# Patient Record
(demographics unavailable — no encounter records)

---

## 2024-10-02 NOTE — ASSESSMENT
[FreeTextEntry1] : Ms. 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 She is doing well since the treatment.  Her MRI brain +/- on 9/27/24 demonstrates no significant change in size of lesion, with central necrosis indicative of treatment response.  She should follow up with her Neurooncologist Dr. Ajay Dawkins and Dr. Ayon as scheduled. She should return to the office in 3 months times with follow up MRI prior to visit. The patient understands the plan of care and is in agreement.  All questions answered to patient satisfaction.

## 2024-10-02 NOTE — DATA REVIEWED
[de-identified] : 	 [address] [place], [place]  [address]                                        Department of Radiology                                             [phone]   Patient Name:      LUIS FERNANDO GARCIA                  Location:       [facility] Rec #:        [id number]                    Account #:      [id number] YOB: 1960                    Ordering:       Veronica Ayon MD Age: 64               Sex:    F                 Attending:      Veronica Ayon MD PCP:        Jennifer Donahue MD ______________________________________________________________________________________  Exam Date:      09/27/24 Exam:         MRI BRAIN WAW  Order#:       MRI [id number]       MR brain with and without gadolinium   CLINICAL INFORMATION:   Gliosis sarcoma, radiation response   TECHNIQUE:   Sagittal and axial T1-weighted images, axial FLAIR images, axial T2-weighted images, axial gradient echo images and axial diffusion weighted images of the brain were obtained. Following 7.5 cc of Gadavist administration, 0 cc discarded, isotropic volumetric and fast spin echo T1- weighted images were obtained; this data was reformatted using image post processing software in multiple imaging planes.   Perfusion weighted imaging was also performed.   FINDINGS:   MRI dated 08/27/2024 is available for review.   The brain demonstrates no significant interval change in the size of the enhancing neoplasm in the RIGHT parieto-occipital lobe and RIGHT splenium of corpus callosum, measuring 8.4 cm AP by 4.6 cm TRV by 7.3 cm CC, however there appears to be more internal necrosis of the lesion indicating treatment response. No areas of increased perfusion are noted within the neoplasm. Small surgical cavity is seen at the lateral aspect of the neoplasm containing hemorrhagic blood products and overlying craniotomy. There is compression of the posterior horn of the RIGHT lateral ventricle. No acute cerebral cortical infarct is found. There is no midline shift. The vertebral and internal carotid arteries demonstrate expected flow voids indicating their patency.   The orbits are unremarkable.  The paranasal sinuses are significant for small mucocele in the RIGHT maxillary sinus.  The nasal cavity appears intact.  The nasopharynx is symmetric.  The central skull base and petrous temporal bones are intact.  The calvarium appears unremarkable.    IMPRESSION: Normal significant interval change in the size of the enhancing neoplasm in the RIGHT parieto-occipital lobe and RIGHT splenium of corpus callosum, measuring 8.4 cm AP by 4.6 cm TRV by 7.3 cm CC, however there appears to be more internal necrosis of the lesion indicating treatment response. . No areas of increased perfusion are noted within the neoplasm. Small surgical cavity is seen at the lateral aspect of the neoplasm containing hemorrhagic blood products and overlying craniotomy. There is compression of the posterior horn of the RIGHT lateral ventricle.   --- End of Report ---  ***Electronically Signed *** ----------------------------------------------- Chaparrita Jones MD              10/02/24 0812  Dictated on 10/02/24

## 2024-10-02 NOTE — HISTORY OF PRESENT ILLNESS
[FreeTextEntry1] : The patient has given verbal consent for this telehealth visit using two-way audio and video technology.  The patient is currently located at home [address] [place] [address], and I am located at my office at [facility].  Ms. Garcia returns to the office today for interval follow up s/p GKRS to right callosal recurrent Gliosarcoma IDH Wildtype WHO Grade 4 (15Gy) on 8/8/24.  Since the procedure, the patient has been feeling ***.  She has undergone a follow up MRI brain +/- on 9/27/24 at [facility] which I have independently reviewed today and which demonstrates no significant interval change in the size of the lesion, however increase in central necrosis indicative of treatment response.    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 Kirk on 7/13/2023. who presents to the office today for neurosurgical consultation for Gamma Knife Radiosurgery for treatment of her recurrent GBM. The patient was originally admitted to [facility] on 7/8/23 with complaints of headache/mental status change. On admission, head CT showed a large area of vasogenic edema centered in the right parietal lobe with associated mass effect and mild leftward midline shift.  She underwent a Right craniotomy for resection of tumor 7/13/23 with Dr. Kirk. Surgery and postop course uneventful. he underwent 30 fractions of RT to the right brain completed on 10/3/23 and chemotherapy and was enrolled in IA Avastin trial at [facility].  Her KPS is 90.  Most recent MRI 7/22/24 at [facility] shows evidence of progression.   Surg Hx: R craniotomy for resection of tumor 7/13/23, JARET/BSO Meds: Celexa, Estradiol Allergies: NKDA Soc Hx: nonsmoker, no EtOH, lives alone

## 2024-10-02 NOTE — PHYSICAL EXAM
[General Appearance - Alert] : alert [General Appearance - In No Acute Distress] : in no acute distress [FreeTextEntry5] : left field cut [FreeTextEntry6] : antigravity x 4 extremities

## 2024-10-11 NOTE — HISTORY OF PRESENT ILLNESS
[FreeTextEntry1] : The patient has given verbal consent for this telehealth visit using two-way audio and video technology.  The patient is currently located at home [address] [place] [address], and I am located at my office at [place].  Ms. Garcia returns to the office today for interval follow up s/p GKRS to right callosal recurrent Gliosarcoma IDH Wildtype WHO Grade 4 (15Gy) on 8/8/24.  Since the procedure, the patient has been feeling well, going to the gym 4 days a week.  She has undergone a follow up MRI brain +/- on 9/27/24 at [place] which I have independently reviewed today and which demonstrates no significant interval change in the size of the lesion, however increase in central necrosis indicative of treatment response.    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 Kirk on 7/13/2023. who presents to the office today for neurosurgical consultation for Gamma Knife Radiosurgery for treatment of her recurrent GBM. The patient was originally admitted to [place] on 7/8/23 with complaints of headache/mental status change. On admission, head CT showed a large area of vasogenic edema centered in the right parietal lobe with associated mass effect and mild leftward midline shift.  She underwent a Right craniotomy for resection of tumor 7/13/23 with Dr. Kirk. Surgery and postop course uneventful. he underwent 30 fractions of RT to the right brain completed on 10/3/23 and chemotherapy and was enrolled in IA Avastin trial at [facility].  Her KPS is 90.  Most recent MRI 7/22/24 at [place] shows evidence of progression.   Surg Hx: R craniotomy for resection of tumor 7/13/23, JARET/BSO Meds: Celexa, Estradiol Allergies: NKDA Soc Hx: nonsmoker, no EtOH, lives alone

## 2024-10-11 NOTE — HISTORY OF PRESENT ILLNESS
[FreeTextEntry1] : The patient has given verbal consent for this telehealth visit using two-way audio and video technology.  The patient is currently located at home [address] [place] [address], and I am located at my office at [facility].  Ms. Garcia returns to the office today for interval follow up s/p GKRS to right callosal recurrent Gliosarcoma IDH Wildtype WHO Grade 4 (15Gy) on 8/8/24.  Since the procedure, the patient has been feeling well, going to the gym 4 days a week.  She has undergone a follow up MRI brain +/- on 9/27/24 at [facility] which I have independently reviewed today and which demonstrates no significant interval change in the size of the lesion, however increase in central necrosis indicative of treatment response.    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 Kirk on 7/13/2023. who presents to the office today for neurosurgical consultation for Gamma Knife Radiosurgery for treatment of her recurrent GBM. The patient was originally admitted to [facility] on 7/8/23 with complaints of headache/mental status change. On admission, head CT showed a large area of vasogenic edema centered in the right parietal lobe with associated mass effect and mild leftward midline shift.  She underwent a Right craniotomy for resection of tumor 7/13/23 with Dr. Kirk. Surgery and postop course uneventful. he underwent 30 fractions of RT to the right brain completed on 10/3/23 and chemotherapy and was enrolled in IA Avastin trial at [facility].  Her KPS is 90.  Most recent MRI 7/22/24 at [facility] shows evidence of progression.   Surg Hx: R craniotomy for resection of tumor 7/13/23, JARET/BSO Meds: Celexa, Estradiol Allergies: NKDA Soc Hx: nonsmoker, no EtOH, lives alone

## 2024-10-11 NOTE — END OF VISIT
[FreeTextEntry3] : I have seen the patient and reviewed the case together with PA and I agree with the final recommendations and plan of care.  Frankie Juarez MD Neurosurgery  [Time Spent: ___ minutes] : I have spent [unfilled] minutes of time on the encounter which excludes teaching and separately reported services.

## 2024-10-11 NOTE — DATA REVIEWED
[de-identified] : 	 [address] [place], [place]  [address]                                        Department of Radiology                                             [phone]   Patient Name:      LUIS FERNANDO GARCIA                  Location:       [facility] Rec #:        [id number]                    Account #:      [id number] YOB: 1960                    Ordering:       Veronica Ayon MD Age: 64               Sex:    F                 Attending:      Veronica Ayon MD PCP:        Jennifer Donahue MD ______________________________________________________________________________________  Exam Date:      09/27/24 Exam:         MRI BRAIN WAW  Order#:       MRI [id number]       MR brain with and without gadolinium   CLINICAL INFORMATION:   Gliosis sarcoma, radiation response   TECHNIQUE:   Sagittal and axial T1-weighted images, axial FLAIR images, axial T2-weighted images, axial gradient echo images and axial diffusion weighted images of the brain were obtained. Following 7.5 cc of Gadavist administration, 0 cc discarded, isotropic volumetric and fast spin echo T1- weighted images were obtained; this data was reformatted using image post processing software in multiple imaging planes.   Perfusion weighted imaging was also performed.   FINDINGS:   MRI dated 08/27/2024 is available for review.   The brain demonstrates no significant interval change in the size of the enhancing neoplasm in the RIGHT parieto-occipital lobe and RIGHT splenium of corpus callosum, measuring 8.4 cm AP by 4.6 cm TRV by 7.3 cm CC, however there appears to be more internal necrosis of the lesion indicating treatment response. No areas of increased perfusion are noted within the neoplasm. Small surgical cavity is seen at the lateral aspect of the neoplasm containing hemorrhagic blood products and overlying craniotomy. There is compression of the posterior horn of the RIGHT lateral ventricle. No acute cerebral cortical infarct is found. There is no midline shift. The vertebral and internal carotid arteries demonstrate expected flow voids indicating their patency.   The orbits are unremarkable.  The paranasal sinuses are significant for small mucocele in the RIGHT maxillary sinus.  The nasal cavity appears intact.  The nasopharynx is symmetric.  The central skull base and petrous temporal bones are intact.  The calvarium appears unremarkable.    IMPRESSION: Normal significant interval change in the size of the enhancing neoplasm in the RIGHT parieto-occipital lobe and RIGHT splenium of corpus callosum, measuring 8.4 cm AP by 4.6 cm TRV by 7.3 cm CC, however there appears to be more internal necrosis of the lesion indicating treatment response. . No areas of increased perfusion are noted within the neoplasm. Small surgical cavity is seen at the lateral aspect of the neoplasm containing hemorrhagic blood products and overlying craniotomy. There is compression of the posterior horn of the RIGHT lateral ventricle.   --- End of Report ---  ***Electronically Signed *** ----------------------------------------------- Chaparrita Jones MD              10/02/24 0812  Dictated on 10/02/24

## 2024-10-11 NOTE — DATA REVIEWED
[de-identified] : 	 [address] [place], [place]  [address]                                        Department of Radiology                                             [phone]   Patient Name:      LUIS FERNANDO GARCIA                  Location:       [facility] Rec #:        [id number]                    Account #:      [id number] YOB: 1960                    Ordering:       Veronica Ayon MD Age: 64               Sex:    F                 Attending:      Veronica Ayon MD PCP:        Jennifer Donahue MD ______________________________________________________________________________________  Exam Date:      09/27/24 Exam:         MRI BRAIN WAW  Order#:       MRI [id number]       MR brain with and without gadolinium   CLINICAL INFORMATION:   Gliosis sarcoma, radiation response   TECHNIQUE:   Sagittal and axial T1-weighted images, axial FLAIR images, axial T2-weighted images, axial gradient echo images and axial diffusion weighted images of the brain were obtained. Following 7.5 cc of Gadavist administration, 0 cc discarded, isotropic volumetric and fast spin echo T1- weighted images were obtained; this data was reformatted using image post processing software in multiple imaging planes.   Perfusion weighted imaging was also performed.   FINDINGS:   MRI dated 08/27/2024 is available for review.   The brain demonstrates no significant interval change in the size of the enhancing neoplasm in the RIGHT parieto-occipital lobe and RIGHT splenium of corpus callosum, measuring 8.4 cm AP by 4.6 cm TRV by 7.3 cm CC, however there appears to be more internal necrosis of the lesion indicating treatment response. No areas of increased perfusion are noted within the neoplasm. Small surgical cavity is seen at the lateral aspect of the neoplasm containing hemorrhagic blood products and overlying craniotomy. There is compression of the posterior horn of the RIGHT lateral ventricle. No acute cerebral cortical infarct is found. There is no midline shift. The vertebral and internal carotid arteries demonstrate expected flow voids indicating their patency.   The orbits are unremarkable.  The paranasal sinuses are significant for small mucocele in the RIGHT maxillary sinus.  The nasal cavity appears intact.  The nasopharynx is symmetric.  The central skull base and petrous temporal bones are intact.  The calvarium appears unremarkable.    IMPRESSION: Normal significant interval change in the size of the enhancing neoplasm in the RIGHT parieto-occipital lobe and RIGHT splenium of corpus callosum, measuring 8.4 cm AP by 4.6 cm TRV by 7.3 cm CC, however there appears to be more internal necrosis of the lesion indicating treatment response. . No areas of increased perfusion are noted within the neoplasm. Small surgical cavity is seen at the lateral aspect of the neoplasm containing hemorrhagic blood products and overlying craniotomy. There is compression of the posterior horn of the RIGHT lateral ventricle.   --- End of Report ---  ***Electronically Signed *** ----------------------------------------------- Chaparrita Jones MD              10/02/24 0812  Dictated on 10/02/24

## 2024-11-07 NOTE — ASSESSMENT
[FreeTextEntry1] : 64-year-old female with right parietal lobe gliosarcoma, WHO grade IV, IDH wild-type, MGMT pending, non-EGFR s/p STR by  on 7/13 who presents for follow up after suffering recurrence and GTR by  on 12/14/23 with flap trial enrollment. 2nd recurrence in Feb 2024 with IA Avastin trial enrollment.  Gliosarcoma - MGMT unmethylated, TERT promoter mutant, PIK3CA mutant, MET amplified (at diagnosis - lost at recurrence)  Plan: --Taper dexamethasone to off, stop tomorrow --Continue with pembrolizumab and Avastin, will attempt to taper Avastin --ordered CBC, CMP, TSH today --patient is on chemotherapy with pembrolizumab/Avastin requiring intensive monitoring for toxicity with CBC, CMP, UA Q 3 wks --labs next visit: CBC, CMP, TSH  RTC at [facility] on 12/5  Vision loss - worsening likely 2/2 disease progression.

## 2024-11-07 NOTE — ASSESSMENT
[FreeTextEntry1] : 64-year-old female with right parietal lobe gliosarcoma, WHO grade IV, IDH wild-type, MGMT pending, non-EGFR s/p STR by  on 7/13 who presents for follow up after suffering recurrence and GTR by  on 12/14/23 with flap trial enrollment. 2nd recurrence in Feb 2024 with IA Avastin trial enrollment.  Gliosarcoma - MGMT unmethylated, TERT promoter mutant, PIK3CA mutant, MET amplified (at diagnosis - lost at recurrence)  Plan: --Taper dexamethasone to off, stop tomorrow --Continue with pembrolizumab and Avastin, will attempt to taper Avastin --ordered CBC, CMP, TSH today --patient is on chemotherapy with pembrolizumab/Avastin requiring intensive monitoring for toxicity with CBC, CMP, UA Q 3 wks --labs next visit: CBC, CMP, TSH  RTC at [facility] on 12/5  Vision loss - worsening likely 2/2 disease progression.

## 2024-11-07 NOTE — ASSESSMENT
[FreeTextEntry1] : 64-year-old female with right parietal lobe gliosarcoma, WHO grade IV, IDH wild-type, MGMT pending, non-EGFR s/p STR by  on 7/13 who presents for follow up after suffering recurrence and GTR by  on 12/14/23 with flap trial enrollment. 2nd recurrence in Feb 2024 with IA Avastin trial enrollment.  Gliosarcoma - MGMT unmethylated, TERT promoter mutant, PIK3CA mutant, MET amplified (at diagnosis - lost at recurrence)  Plan: --Taper dexamethasone to off, stop tomorrow --Continue with pembrolizumab and Avastin, will attempt to taper Avastin --ordered CBC, CMP, TSH today --patient is on chemotherapy with pembrolizumab/Avastin requiring intensive monitoring for toxicity with CBC, CMP, UA Q 3 wks --labs next visit: CBC, CMP, TSH  RTC at [facility] on 12/5  Vision loss - worsening likely 2/2 disease progression.
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
## 2024-11-18 NOTE — ASSESSMENT
[FreeTextEntry1] : My impression is that the patient suffers from Gliosarcoma.  Her MRIB from today shows evidence of progression. Her KPS is 80. I had a long discussion with the patient regarding the role of continuing  IV Avastin.  The patient was extensively educated about the nature of her disease process. Therapeutic and diagnostic tests include MRI brain w/wo in 1 month (December 2024). The patient should continue to see Dr. Dawkins. I will see the patient back in December to review and check progress.
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
## 2024-11-20 NOTE — REASON FOR VISIT
[Consideration of Curative Therapy] : consideration of curative therapy for [Brain Tumor] : brain tumor [Friend] : friend
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
## 2024-12-03 NOTE — PHYSICAL EXAM
Problem: Adult Inpatient Plan of Care  Goal: Absence of Hospital-Acquired Illness or Injury  Outcome: Ongoing, Progressing  Goal: Optimal Comfort and Wellbeing  Outcome: Ongoing, Progressing  Goal: Readiness for Transition of Care  Outcome: Ongoing, Progressing     Problem: Fall Injury Risk  Goal: Absence of Fall and Fall-Related Injury  Outcome: Ongoing, Progressing     Problem: Skin Injury Risk Increased  Goal: Skin Health and Integrity  Outcome: Ongoing, Progressing     Problem: Diabetes Comorbidity  Goal: Blood Glucose Level Within Targeted Range  Outcome: Ongoing, Progressing      [Normal] : affect appropriate
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
## 2024-12-05 NOTE — PHYSICAL EXAM
[Sensation] : the sensory exam was normal to light touch and pinprick [Motor Exam] : the motor exam was normal [Normal] : oriented to person, place and time, the affect was normal, the mood was normal and not anxious [de-identified] : Visual field cut and left side.  No cerebellar signs
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
## 2024-12-05 NOTE — PHYSICAL EXAM
[Sensation] : the sensory exam was normal to light touch and pinprick [Motor Exam] : the motor exam was normal [Normal] : oriented to person, place and time, the affect was normal, the mood was normal and not anxious [de-identified] : Visual field cut and left side.  No cerebellar signs

## 2024-12-05 NOTE — REVIEW OF SYSTEMS
[Fatigue] : fatigue [Difficulty Walking] : difficulty walking [Negative] : Allergic/Immunologic [Confused] : no confusion [Dizziness] : no dizziness [Fainting] : no fainting

## 2024-12-06 NOTE — ASSESSMENT
[FreeTextEntry1] : 64-year-old female with right parietal lobe gliosarcoma, WHO grade IV, IDH wild-type, MGMT pending, non-EGFR s/p STR by  on 7/13 who presents for follow up after suffering recurrence and GTR by  on 12/14/23 with flap trial enrollment. 2nd recurrence in Feb 2024 with IA Avastin trial enrollment.  Gliosarcoma - MGMT unmethylated, TERT promoter mutant, PIK3CA mutant, MET amplified (at diagnosis - lost at recurrence)  Plan: --start tapering dexamethasone again - 4 mg BID x 5 days, then 4/2 x 5 days, then 2/2 x 5 days --> c/w this taper if no HAs --C/w carboplatin/Avastin --ordered CBC, CMP, TSH today --patient is on chemotherapy with carboplatin/Avastin requiring intensive monitoring for toxicity with CBC, CMP, UA Q 3 wks --labs next visit: CBC, CMP, TSH --refer to  for supportive oncology --MRIB ordered, to be done before enxt visit  RTC at [facility] on 1/2/2025  Vision loss - worsening likely 2/2 disease progression.
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
## 2024-12-06 NOTE — ASSESSMENT
[FreeTextEntry1] : 64-year-old female with right parietal lobe gliosarcoma, WHO grade IV, IDH wild-type, MGMT pending, non-EGFR s/p STR by  on 7/13 who presents for follow up after suffering recurrence and GTR by  on 12/14/23 with flap trial enrollment. 2nd recurrence in Feb 2024 with IA Avastin trial enrollment.  Gliosarcoma - MGMT unmethylated, TERT promoter mutant, PIK3CA mutant, MET amplified (at diagnosis - lost at recurrence)  Plan: --start tapering dexamethasone again - 4 mg BID x 5 days, then 4/2 x 5 days, then 2/2 x 5 days --> c/w this taper if no HAs --C/w carboplatin/Avastin --ordered CBC, CMP, TSH today --patient is on chemotherapy with carboplatin/Avastin requiring intensive monitoring for toxicity with CBC, CMP, UA Q 3 wks --labs next visit: CBC, CMP, TSH --refer to  for supportive oncology --MRIB ordered, to be done before enxt visit  RTC at [facility] on 1/2/2025  Vision loss - worsening likely 2/2 disease progression.

## 2024-12-06 NOTE — ASSESSMENT
[FreeTextEntry1] : 64-year-old female with right parietal lobe gliosarcoma, WHO grade IV, IDH wild-type, MGMT pending, non-EGFR s/p STR by  on 7/13 who presents for follow up after suffering recurrence and GTR by  on 12/14/23 with flap trial enrollment. 2nd recurrence in Feb 2024 with IA Avastin trial enrollment.  Gliosarcoma - MGMT unmethylated, TERT promoter mutant, PIK3CA mutant, MET amplified (at diagnosis - lost at recurrence)  Plan: --start tapering dexamethasone again - 4 mg BID x 5 days, then 4/2 x 5 days, then 2/2 x 5 days --> c/w this taper if no HAs --C/w carboplatin/Avastin --ordered CBC, CMP, TSH today --patient is on chemotherapy with carboplatin/Avastin requiring intensive monitoring for toxicity with CBC, CMP, UA Q 3 wks --labs next visit: CBC, CMP, TSH --refer to  for supportive oncology --MRIB ordered, to be done before enxt visit  RTC at [facility] on 1/2/2025  Vision loss - worsening likely 2/2 disease progression.
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
## 2024-12-13 NOTE — GOALS
[Patient] : patient [DNR/DNI] : DNR/DNI [Hospice discussed] : Hospice discussed [MOLST Completed] : MOLST Completed [Time Spent: ___ minutes] : I, personally, spent [unfilled] minutes on advance care planning services with the patient.  This time is separate and distinct from any other care management services provided on this date [FreeTextEntry6] : Yusra Doe [FreeTextEntry5] : friend [FreeTextEntry7] : Discussed terminal nature of patient's cancer. She is fully aware that her cancer will continue to progress and that there is no cure. Recently, she started to make preparations for when she dies, including making a will, making  arrangements and making arrangements to donate her brain to research. She has also assigned her brother Franco Kumar as her proxy. We discussed what quality of life would be important to her and she explained that she has always been a very active person, going to the gym regularly, going out to dinner, seeing friends. But for her, the most important aspect of her life is being able to engage with friends and family in a meaningful way. We spoke about code status and she was clear that she does not want to undergo CPR or be intubated under any circumstances. We also discussed hospice and she stated that "when the time comes, I want to be on hospice at home." We spoke about how she would need to stop treatment to be eligible for hospice, which she understood, and she even explained that though she lives alone, her brother has already agreed to come from [place] and stay with her when the time comes for her to be on hospice. [LastACPVerDate] : 12/13/2024

## 2024-12-13 NOTE — HISTORY OF PRESENT ILLNESS
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 Accompanied by friend Yusra.   Onc Hx: Presented 7/2023 to [facility] with HA, MRI done which showed mass of R parietal lobe (4.9x4.3x4.9cm). Underwent R temporoparietal craniotomy and resection of tumor, biopsy positive for high grade glioma. Treated post-resection with TMZ and RT, completed 10/2023. Treatment: TMZ/RT completed 10/2023. Followed by IA Avastin 2/2024-6/2024. Then Carboplatin + Avastin 9/2024, ongoing.  Providers: Oncologist - Ajay Dawkins   SYMPTOMS: Most debilitating symptom is HA and L sided vision loss. Headaches have improved over past months since dexamethasone increased. On a dex taper currently. Now getting occasional mild headaches which are well managed with tylenol. Recently has noticed that she goes out less, particularly over the past month, due to feeling slightly disoriented at times and due to vision loss. Friend has noticed that this occurs particularly in new places.  ISTOP Ref #: [id number]   Advanced Directives: HCP - Franco Kumar (brother) MOLST - None  SH: Was a  for [employer] prior to diagnosis. Has a daughter and grandson who live in [place]. Has a good social [place] locally who are very supportive. Has a brother (in [place], her HCP) and sister.  ROS: If [ ] blank, symptom not present Fatigue [ ] Nausea [ ] Loss of appetite [ ] Unintentional weight loss [ ] Constipation [ ] Diarrhea [ ] Anxiety [ ] Low mood [ ] Other symptoms: [x ] All other review of symptoms negative

## 2024-12-13 NOTE — PHYSICAL EXAM
[General Appearance - Alert] : alert [General Appearance - In No Acute Distress] : in no acute distress [No Oral Pallor] : no oral pallor [Outer Ear] : the ears and nose were normal in appearance [Hearing Threshold Finger Rub Not [place]] : hearing was normal [Neck Appearance] : the appearance of the neck was normal [] : no respiratory distress [Respiration, Rhythm And Depth] : normal respiratory rhythm and effort [Skin Color & Pigmentation] : normal skin color and pigmentation [Oriented To Time, Place, And Person] : oriented to person, place, and time [Impaired Insight] : insight and judgment were intact [Affect] : the affect was normal [Mood] : the mood was normal

## 2024-12-13 NOTE — REASON FOR VISIT
[Home] : at home, [unfilled] , at the time of the visit. [Medical Office: ([facility])___] : at the medical office located in  [Patient] : the patient

## 2024-12-13 NOTE — ASSESSMENT
[FreeTextEntry1] : - 65 yo F with GBM (parietal lob, s/p resection 7/2023) here for palliative care support.  - C/w steroid taper per oncology. Recommended to patient to discuss with onc if her headaches return and become severe - Medical cannabis certification completed today. Counseled patient on [facility] Medical Cannabis program. - Start cannabis at 1:1 THC:CBD at 2mg THC per dose, 2-3 times daily PRN  #ACP See separate GOC note for details of conversation. - DNR/DNI (MOLST completed today 12/13/24) - HCP: Franco Kumar (brother)  F/u as needed

## 2024-12-13 NOTE — PHYSICAL EXAM
[General Appearance - Alert] : alert [General Appearance - In No Acute Distress] : in no acute distress [No Oral Pallor] : no oral pallor [Outer Ear] : the ears and nose were normal in appearance [Hearing Threshold Finger Rub Not [place]] : hearing was normal [Neck Appearance] : the appearance of the neck was normal [] : no respiratory distress [Respiration, Rhythm And Depth] : normal respiratory rhythm and effort [Skin Color & Pigmentation] : normal skin color and pigmentation [Oriented To Time, Place, And Person] : oriented to person, place, and time [Impaired Insight] : insight and judgment were intact [Affect] : the affect was normal [Mood] : the mood was normal

## 2024-12-13 NOTE — HISTORY OF PRESENT ILLNESS
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 Accompanied by friend Yusra.   Onc Hx: Presented 7/2023 to [facility] with HA, MRI done which showed mass of R parietal lobe (4.9x4.3x4.9cm). Underwent R temporoparietal craniotomy and resection of tumor, biopsy positive for high grade glioma. Treated post-resection with TMZ and RT, completed 10/2023. Treatment: TMZ/RT completed 10/2023. Followed by IA Avastin 2/2024-6/2024. Then Carboplatin + Avastin 9/2024, ongoing.  Providers: Oncologist - Ajay Dawkins   SYMPTOMS: Most debilitating symptom is HA and L sided vision loss. Headaches have improved over past months since dexamethasone increased. On a dex taper currently. Now getting occasional mild headaches which are well managed with tylenol. Recently has noticed that she goes out less, particularly over the past month, due to feeling slightly disoriented at times and due to vision loss. Friend has noticed that this occurs particularly in new places.  ISTOP Ref #: [id number]   Advanced Directives: HCP - Franco Kumar (brother) MOLST - None  SH: Was a  for [employer] prior to diagnosis. Has a daughter and grandson who live in [place]. Has a good social [place] locally who are very supportive. Has a brother (in [place], her HCP) and sister.  ROS: If [ ] blank, symptom not present Fatigue [ ] Nausea [ ] Loss of appetite [ ] Unintentional weight loss [ ] Constipation [ ] Diarrhea [ ] Anxiety [ ] Low mood [ ] Other symptoms: [x ] All other review of symptoms negative

## 2024-12-13 NOTE — ASSESSMENT
[FreeTextEntry1] : - 65 yo F with GBM (parietal lob, s/p resection 7/2023) here for palliative care support.  - C/w steroid taper per oncology. Recommended to patient to discuss with onc if her headaches return and become severe - Medical cannabis certification completed today. Counseled patient on [facility] Medical Cannabis program. - Start cannabis at 1:1 THC:CBD at 2mg THC per dose, 2-3 times daily PRN  #ACP See separate GOC note for details of conversation. - DNR/DNI (MOLST completed today 12/13/24) - HCP: Franco Kumar (brother)  F/u as needed

## 2024-12-31 NOTE — END OF VISIT
Hospitalist Neurology [Time Spent: ___ minutes] : I have spent [unfilled] minutes of time on the encounter which excludes teaching and separately reported services. Internal Medicine Hospitalist Hospitalist

## 2025-01-04 NOTE — ASSESSMENT
[FreeTextEntry1] : 64-year-old female with right parietal lobe gliosarcoma, WHO grade IV, IDH wild-type, MGMT pending, non-EGFR s/p STR by  on 7/13 who presents for follow up after suffering recurrence and GTR by  on 12/14/23 with flap trial enrollment. 2nd recurrence in Feb 2024 with IA Avastin trial now on Avastin for recurrent disease.  Gliosarcoma - MGMT unmethylated, TERT promoter mutant, PIK3CA mutant, MET amplified (at diagnosis - lost at recurrence). --I independently reviewed her MRI brain which was performed on 12/27.  This is now showing new area of enhancement in contralateral splenium of the corpus callosum.  She is clearly also obviously very symptomatic. --I recommend she increase her steroids now to dexamethasone 4 mg in the morning and 2 mg in the evening. --We also discussed alternative treatment options for her recurrent gliosarcoma.  There is no approved inhibitor PIK3CA mutations for glioblastoma, though Piqray which is using breast cancer could potentially be used, however given that limited activity against 1 subunit there has not been even a case report supporting its use.  All the studies which use targeted inhibitors for PIK3CA mutations in larger molecular targeted cohort did not show activity in glioblastoma. Selinexor is currently being studied in phase 3 clinical trials against recurrent glioblastoma.  There was a patient population in phase 2 clinical trials which showed disease control/stability.  We discussed risks, benefits and side effect profile selinexor today which includes but is not limited to severe cytopenias, nausea, vomiting, other GI side effects, ocular toxicity.  Informed consent was signed. --Change Avastin to 10 mg/kilogram every 2 weeks dosing, cancel further carboplatin given disease progression. --Patient was counseled on terminal prognosis, best supportive care options also discussed.  At this time the patient would still like to pursue disease modifying treatments. --ordered CBC, CMP, TSH, UA --patient is on chemotherapy with Avastin requiring intensive monitoring for toxicity with CBC, CMP, UA Q 2 wks --labs next visit: CBC, CMP, TSH  RTC at [facility] on 2/6/25  Vision loss - worsening likely 2/2 disease progression.  Anxiety --Rx for Xanax 0.5 mg daily PRN

## 2025-01-04 NOTE — ASSESSMENT
[FreeTextEntry1] : 64-year-old female with right parietal lobe gliosarcoma, WHO grade IV, IDH wild-type, MGMT pending, non-EGFR s/p STR by  on 7/13 who presents for follow up after suffering recurrence and GTR by  on 12/14/23 with flap trial enrollment. 2nd recurrence in Feb 2024 with IA Avastin trial now on Avastin for recurrent disease.  Gliosarcoma - MGMT unmethylated, TERT promoter mutant, PIK3CA mutant, MET amplified (at diagnosis - lost at recurrence). --I independently reviewed her MRI brain which was performed on 12/27.  This is now showing new area of enhancement in contralateral splenium of the corpus callosum.  She is clearly also obviously very symptomatic. --I recommend she increase her steroids now to dexamethasone 4 mg in the morning and 2 mg in the evening. --We also discussed alternative treatment options for her recurrent gliosarcoma.  There is no approved inhibitor PIK3CA mutations for glioblastoma, though Piqray which is using breast cancer could potentially be used, however given that limited activity against 1 subunit there has not been even a case report supporting its use.  All the studies which use targeted inhibitors for PIK3CA mutations in larger molecular targeted cohort did not show activity in glioblastoma. Selinexor is currently being studied in phase 3 clinical trials against recurrent glioblastoma.  There was a patient population in phase 2 clinical trials which showed disease control/stability.  We discussed risks, benefits and side effect profile selinexor today which includes but is not limited to severe cytopenias, nausea, vomiting, other GI side effects, ocular toxicity.  Informed consent was signed. --Change Avastin to 10 mg/kilogram every 2 weeks dosing, cancel further carboplatin given disease progression. --Patient was counseled on terminal prognosis, best supportive care options also discussed.  At this time the patient would still like to pursue disease modifying treatments. --ordered CBC, CMP, TSH, UA --patient is on chemotherapy with Avastin requiring intensive monitoring for toxicity with CBC, CMP, UA Q 2 wks --labs next visit: CBC, CMP, TSH  RTC at [facility] on 2/6/25  Vision loss - worsening likely 2/2 disease progression.  Anxiety --Rx for Xanax 0.5 mg daily PRN
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
## 2025-01-04 NOTE — PHYSICAL EXAM
[Normal] : affect appropriate [de-identified] : Bilateral hemianopsia.  Decreased proprioception in bilateral lower extremities.  Strength intact

## 2025-01-04 NOTE — REVIEW OF SYSTEMS
[Diarrhea: Grade 0] : Diarrhea: Grade 0 [Negative] : Allergic/Immunologic [Eye Pain] : eye pain [Muscle Weakness] : muscle weakness [Fever] : no fever [Chills] : no chills [Night Sweats] : no night sweats [Fatigue] : no fatigue [Recent Change In Weight] : ~T no recent weight change [FreeTextEntry3] : recent fasll,now on antibiotic eye drops 1/1/2025 [FreeTextEntry9] : Using wheel chair
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
## 2025-01-04 NOTE — PHYSICAL EXAM
[Normal] : affect appropriate [de-identified] : Bilateral hemianopsia.  Decreased proprioception in bilateral lower extremities.  Strength intact
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
## 2025-01-04 NOTE — PHYSICAL EXAM
[Normal] : affect appropriate [de-identified] : Bilateral hemianopsia.  Decreased proprioception in bilateral lower extremities.  Strength intact
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
## 2025-01-21 NOTE — REASON FOR VISIT
[Home] : at home, [unfilled] , at the time of the visit. [Medical Office: ([facility])___] : at the medical office located in  [Follow-Up: _____] : a [unfilled] follow-up visit [FreeTextEntry1] : discuss additional therapies

## 2025-01-21 NOTE — ASSESSMENT
[FreeTextEntry1] : My impression is that the patient suffers from Gliosarcoma.  Her MRIB from today shows evidence of progression. Her KPS is 50. Her left arm and left leg numbness/tingling is significantly worse. I had a long discussion with the patient regarding the role of continuing  IV Avastin and getting home assistance as her brother is the primary caretaker at this time.  The patient was extensively educated about the nature of her disease process. Therapeutic and diagnostic tests include MRI brain w/wo in 1 month (February 2025). The patient should continue to see Dr. Dawkins. I have reached out to social work to assist in home services. I will see the patient back in February to review and check progress.

## 2025-01-23 NOTE — REASON FOR VISIT
[Home] : at home, [unfilled] , at the time of the visit. [Medical Office: ([facility])___] : at the medical office located in  [Follow-Up: _____] : a [unfilled] follow-up visit [FreeTextEntry1] : discuss additional therapies
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
## 2025-01-23 NOTE — ASSESSMENT
[FreeTextEntry1] : My impression is that the patient suffers from Gliosarcoma.  Her KPS is 50. Her left arm and left leg numbness/tingling is significantly worse. Her brother is her primary caregiver at this time. I had a long discussion with the patient regarding the role of continuing  IV Avastin and getting home assistance. I have requested [facility] be involved. The patient was extensively educated about the nature of her disease process. Therapeutic and diagnostic tests include MRI brain w/wo in 1 month (February 2025). The patient should continue to see Dr. Dawkins. I have reached out to social work to assist in home services. I will see the patient back in February to review and check progress.

## 2025-01-24 NOTE — PHYSICAL EXAM
[General Appearance - Alert] : alert [General Appearance - In No Acute Distress] : in no acute distress [FreeTextEntry1] : Chronically ill appearing [] : no respiratory distress [Respiration, Rhythm And Depth] : normal respiratory rhythm and effort [Oriented To Time, Place, And Person] : oriented to person, place, and time [Impaired Insight] : insight and judgment were intact [Affect] : the affect was normal [Mood] : the mood was normal

## 2025-01-24 NOTE — ASSESSMENT
[FreeTextEntry1] : - 65 yo F with GBM (parietal lob, s/p resection 7/2023) here for palliative care support.  - Medical cannabis certification completed previously. Counseled patient on [facility] Medical Cannabis program. - Start cannabis at 1:1 THC:CBD at 2mg THC per dose, 2-3 times daily PRN - Tramadol 50mg PO q6h PRN head pain  #ACP Discussed progression of disease and progression of side effects and how she feels her quality of life is poor. She and her brother feel that it has become extremely burdensome to go in for treatment and office visits. We discussed how it does not appear any of the treatments, including the Avastin, have any significant positive effect on her quality of life and that it appears time to enroll in hospice and stop treatment. Leydi felt, however, that given that she has an upcoming MRI and a visit with oncology, she wants to see the progression of disease on imaging and talk to Dr. Dawkins before stopping treatment. - DNR/DNI (MOLST completed 12/13/24) - HCP: Franco Kumar (brother) - Will readdress hospice and discontinuation of treatment following upcoming MRI and onc visit  F/u in 2 weeks

## 2025-01-24 NOTE — HISTORY OF PRESENT ILLNESS
[FreeTextEntry1] : - 63 yo F with gliosarcoma (parietal lob, s/p resection 7/2023) here for palliative care support. Accompanied by brother Franco Kumar.   Onc Hx: Presented 7/2023 to [facility] with HA, MRI done which showed mass of R parietal lobe (4.9x4.3x4.9cm). Underwent R temporoparietal craniotomy and resection of tumor, biopsy positive for high grade glioma. Treated post-resection with TMZ and RT, completed 10/2023. Treatment: TMZ/RT completed 10/2023. Followed by IA Avastin 2/2024-6/2024. Then Carboplatin + Avastin 9/2024, ongoing.  Providers: Oncologist - Ajay Dawkins  Interval Hx: Progression of disease seen on repeat MRI brain 12/27. New area of enhancement in contralateral (right) splenium of corpus callosum. Now has weakness on L side, unable to ambulate alone. Needs 2 people to get her to bathroom. Feels "my quality of life sucks." Had first Avastin last week and felt her strength on L side improve but only for a couple of days and then went back to new baseline.  SYMPTOMS: Headaches started recently. Not always relieved by Aleve.  ISTOP Ref #: [id number]   Advanced Directives: HCP - Franco Kumar (brother) MOLST - None  SH: Was a  for [employer] prior to diagnosis. Has a daughter and grandson who live in [place]. Has a good social [place] locally who are very supportive. Has a brother (in [place], her HCP) and sister.  ROS: If [ ] blank, symptom not present Fatigue [ ] Nausea [ ] Loss of appetite [ ] Unintentional weight loss [ ] Constipation [ ] Diarrhea [ ] Anxiety [ ] Low mood [ ] Other symptoms: [x ] All other review of symptoms negative

## 2025-01-31 NOTE — PHYSICAL EXAM
[Normal] : affect appropriate [de-identified] : Bilateral hemianopsia.  Decreased proprioception in bilateral lower extremities.  Strength intact

## 2025-01-31 NOTE — ASSESSMENT
[FreeTextEntry1] : 64-year-old female with right parietal lobe gliosarcoma, WHO grade IV, IDH wild-type, MGMT pending, non-EGFR s/p STR by  on 7/13 who presents for follow up after suffering recurrence and GTR by  on 12/14/23 with flap trial enrollment. 2nd recurrence in Feb 2024 with IA Avastin trial now on Avastin for recurrent disease.  Gliosarcoma - MGMT unmethylated, TERT promoter mutant, PIK3CA mutant, MET amplified (at diagnosis - lost at recurrence). --I independently reviewed her MRI brain which was performed on 12/27.  This is now showing new area of enhancement in contralateral splenium of the corpus callosum.  She is clearly also obviously very symptomatic. --I recommend she increase her steroids now to dexamethasone 4 mg in the morning and 2 mg in the evening. --We also discussed alternative treatment options for her recurrent gliosarcoma.  There is no approved inhibitor PIK3CA mutations for glioblastoma, though Piqray which is using breast cancer could potentially be used, however given that limited activity against 1 subunit there has not been even a case report supporting its use.  All the studies which use targeted inhibitors for PIK3CA mutations in larger molecular targeted cohort did not show activity in glioblastoma. Selinexor is currently being studied in phase 3 clinical trials against recurrent glioblastoma.  There was a patient population in phase 2 clinical trials which showed disease control/stability.  We discussed risks, benefits and side effect profile selinexor today which includes but is not limited to severe cytopenias, nausea, vomiting, other GI side effects, ocular toxicity.  Informed consent was signed. --Change Avastin to 10 mg/kilogram every 2 weeks dosing, cancel further carboplatin given disease progression. --Patient was counseled on terminal prognosis, best supportive care options also discussed.  At this time the patient would still like to pursue disease modifying treatments. --ordered CBC, CMP, TSH, UA --patient is on chemotherapy with Avastin requiring intensive monitoring for toxicity with CBC, CMP, UA Q 2 wks --labs next visit: CBC, CMP, TSH  RTC at [facility] on 2/6/25  Vision loss - worsening likely 2/2 disease progression.  Anxiety --Rx for Xanax 0.5 mg daily PRN

## 2025-01-31 NOTE — REVIEW OF SYSTEMS
[Fever] : no fever [Chills] : no chills [Night Sweats] : no night sweats [Fatigue] : no fatigue [Recent Change In Weight] : ~T no recent weight change [Eye Pain] : eye pain [Diarrhea: Grade 0] : Diarrhea: Grade 0 [Muscle Weakness] : muscle weakness [Negative] : Allergic/Immunologic [FreeTextEntry3] : recent fasll,now on antibiotic eye drops 1/1/2025 [FreeTextEntry9] : Using wheel chair
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
